# Patient Record
Sex: FEMALE | Race: WHITE | Employment: UNEMPLOYED | URBAN - METROPOLITAN AREA
[De-identification: names, ages, dates, MRNs, and addresses within clinical notes are randomized per-mention and may not be internally consistent; named-entity substitution may affect disease eponyms.]

---

## 2024-07-12 ENCOUNTER — HOSPITAL ENCOUNTER (OUTPATIENT)
Dept: MAMMOGRAPHY | Age: 44
End: 2024-07-12
Payer: MEDICAID

## 2024-07-12 DIAGNOSIS — Z12.31 ENCOUNTER FOR SCREENING MAMMOGRAM FOR MALIGNANT NEOPLASM OF BREAST: ICD-10-CM

## 2024-07-12 PROCEDURE — 77063 BREAST TOMOSYNTHESIS BI: CPT

## 2024-08-08 ENCOUNTER — TELEPHONE (OUTPATIENT)
Dept: INTERNAL MEDICINE | Age: 44
End: 2024-08-08

## 2024-08-28 ENCOUNTER — OFFICE VISIT (OUTPATIENT)
Dept: INTERNAL MEDICINE | Age: 44
End: 2024-08-28

## 2024-08-28 VITALS
HEART RATE: 76 BPM | HEIGHT: 66 IN | WEIGHT: 166 LBS | DIASTOLIC BLOOD PRESSURE: 80 MMHG | RESPIRATION RATE: 16 BRPM | SYSTOLIC BLOOD PRESSURE: 120 MMHG | BODY MASS INDEX: 26.68 KG/M2

## 2024-08-28 DIAGNOSIS — Z80.3 FAMILY HISTORY OF BREAST CANCER: ICD-10-CM

## 2024-08-28 DIAGNOSIS — E66.3 OVERWEIGHT: ICD-10-CM

## 2024-08-28 DIAGNOSIS — R92.8 ABNORMAL MAMMOGRAM OF RIGHT BREAST: Primary | ICD-10-CM

## 2024-08-28 DIAGNOSIS — R41.844 EXECUTIVE FUNCTION DEFICIT: ICD-10-CM

## 2024-08-28 DIAGNOSIS — R41.840 DIFFICULTY CONCENTRATING: ICD-10-CM

## 2024-08-28 SDOH — ECONOMIC STABILITY: INCOME INSECURITY: HOW HARD IS IT FOR YOU TO PAY FOR THE VERY BASICS LIKE FOOD, HOUSING, MEDICAL CARE, AND HEATING?: SOMEWHAT HARD

## 2024-08-28 SDOH — ECONOMIC STABILITY: FOOD INSECURITY: WITHIN THE PAST 12 MONTHS, YOU WORRIED THAT YOUR FOOD WOULD RUN OUT BEFORE YOU GOT MONEY TO BUY MORE.: NEVER TRUE

## 2024-08-28 SDOH — ECONOMIC STABILITY: FOOD INSECURITY: WITHIN THE PAST 12 MONTHS, THE FOOD YOU BOUGHT JUST DIDN'T LAST AND YOU DIDN'T HAVE MONEY TO GET MORE.: NEVER TRUE

## 2024-08-28 ASSESSMENT — PATIENT HEALTH QUESTIONNAIRE - PHQ9
SUM OF ALL RESPONSES TO PHQ QUESTIONS 1-9: 2
2. FEELING DOWN, DEPRESSED OR HOPELESS: SEVERAL DAYS
SUM OF ALL RESPONSES TO PHQ9 QUESTIONS 1 & 2: 2
SUM OF ALL RESPONSES TO PHQ QUESTIONS 1-9: 2
SUM OF ALL RESPONSES TO PHQ QUESTIONS 1-9: 2
1. LITTLE INTEREST OR PLEASURE IN DOING THINGS: SEVERAL DAYS
SUM OF ALL RESPONSES TO PHQ QUESTIONS 1-9: 2

## 2024-08-28 NOTE — PROGRESS NOTES
DR. NICKERSON - NEW PATIENT HISTORY & PHYSICAL EXAM    CHIEF COMPLAINT: Abnormal mammogram and discuss possible attention deficit and hyperactivity disorder    HISTORY OF CHIEF COMPLAINT: This 44 y.o.  female comes in today to be established with me as her new primary care physician. She does not have a regular primary care provider. Recently she had a mammogram that showed some abnormalities and there were recommendations for additional testing based on those results. There is a history of breast cancer in her family. Her mother had breast cancer. In addition she says an aunt told her that there is a history of one of the breast cancer genes running in her family as well. She has never had genetic testing before. The other issue that she wanted to discuss is the possibility that she could have adult attention deficit and hyperactivity disorder. She says that when she was in high school she had some rudimentary screening for it that indicated that she could have ADHD but she never had any formal follow up testing or received any treatment for it. She has always struggled with concentrating on tasks and remembering to do things and she describes having to set up elaborate organizational schemes to help her remember things. She says she has difficulty with executive function and it always late to places as well. She lost her job about 7 months ago in part because of these issues. She is very interested in having formal testing for that and getting treated for it. There are no other complaints.      ALLERGIES:   Allergies   Allergen Reactions    Bee Venom Swelling       MEDICATIONS:   No outpatient medications have been marked as taking for the 8/28/24 encounter (Office Visit) with Juvencio Nickerson DO.       PAST MEDICAL HISTORY: History reviewed. No pertinent past medical history.    PAST SURGICAL HISTORY: History reviewed. No pertinent surgical history.    SOCIAL HISTORY:     Tobacco:   Social  right breast and we will also consider ordering a breast MRI in the near future as well  - [facility]  - [facility]; Future    3. Difficulty concentrating, ongoing  4. Executive function deficit, ongoing  - Based on what she said in the history, it sounds like she could have attention deficit and hyperactivity disorder  - We will refer her to Dr. Bledsoe to begin the evaluation process  [facility] [place]    5. Overweight  - We discussed weight loss  - She will continue to watch her diet and exercise       Orders Placed This Encounter   Procedures    [facility]     Standing Status:   Future     Standing Expiration Date:   10/28/2025    [facility]     Referral Priority:   Routine     Referral Type:   Eval and Treat     Referral Reason:   Specialty Services Required     Requested Specialty:   Oncology     Number of Visits Requested:   1    Melody Cruz PsyD, [facility] [place], [place]     Referral Priority:   Routine     Referral Type:   Eval and Treat     Referral Reason:   Specialty Services Required     Referred to Provider:   Melody Bledsoe PSYD     Requested Specialty:   Behavioral Health     Number of Visits Requested:   1       Requested Prescriptions      No prescriptions requested or ordered in this encounter       Return in about 6 weeks (around 10/9/2024).        Electronically signed by SHAVONNE NICKERSON DO on 8/28/2024 at 4:20 PM  Internal Medicine

## 2024-08-29 ENCOUNTER — OFFICE VISIT (OUTPATIENT)
Dept: BEHAVIORAL/MENTAL HEALTH | Age: 44
End: 2024-08-29
Payer: MEDICAID

## 2024-08-29 DIAGNOSIS — F43.23 ADJUSTMENT DISORDER WITH MIXED ANXIETY AND DEPRESSED MOOD: Primary | ICD-10-CM

## 2024-08-29 DIAGNOSIS — R41.844 EXECUTIVE FUNCTION DEFICIT: ICD-10-CM

## 2024-08-29 PROCEDURE — 90791 PSYCH DIAGNOSTIC EVALUATION: CPT | Performed by: COUNSELOR

## 2024-08-29 NOTE — PROGRESS NOTES
Behavioral Health Consultation/Psychotherapy Note  Melody Bledsoe Psy.D.  Visit Date:  8/29/2024    Patient:  Tamathy Stage  YOB: 1980  Chief Complaint:  Other (Suspected ADHD; requesting evaluation) and Stress    Duration of session:  60 minutes      S:       Patient presented alone for appointment and engaged readily. Patient was known to clinician via prior introduction at her recent PCP visit. Patient reviewed referral information provided by referring provider and confirmed contents. Patient provided additional information to clarify and/or elaborate upon provided referral info. Patient indicated no significant changes in context since last contact.  Patient reported continued difficulties with elevated situational stress and associated emotional impact, as well as long-standing concerns regarding executive dysfunction. Patient noted particular concerns with direction and maintenance of concentration, task persistence, sluggish cognitive tempo, and organization, among other functions. Patient indicated that the disruption caused by these symptoms was a direct contributor to the loss of her job earlier this year. Patient reported that her family had expressed similar concerns when she was younger, but that they had been unable to afford the full testing needed to evaluate formally for potential diagnosis. Patient reviewed psychoeducational content associated with topics of session as appropriate, including discussion of executive dys/function and review of basic principles of self-care and illness management / recovery. Patient engaged in thought challenging and cognitive restructuring tasks as needed. Patient reviewed recent use of self-care and active coping strategies and discussed areas for potential adjustments which might better suit patient's behavioral needs. Patient discussed current treatment needs and reviewed available options.     Topic areas discussed during session:  Specific

## 2024-08-29 NOTE — PSYCHOTHERAPY
Returned inventories given yesterday -- clinical results on all measures. Collateral forms still pending -- sister has agreed to complete but lives out of state. Given PDF copies of forms to forward to sister for completion.     Mom was educator who specialized in learning disabilities, so recognized something was up and had patient evaluated partially. Couldn't be fully diagnosed b/c would have been too expensive. Doesn't know where paper went -- pretty sure mom had ADHD, so didn't know where she stashed it and mom has been  for 10 years now. Does remember that what came back was that pt's brain does function like having ADHD, but she didn't act out the hyperactive side of things externally.     Started DIVA-5 --  on child part of inattentive #1 next time.

## 2024-09-04 ENCOUNTER — OFFICE VISIT (OUTPATIENT)
Dept: BEHAVIORAL/MENTAL HEALTH | Age: 44
End: 2024-09-04
Payer: MEDICAID

## 2024-09-04 DIAGNOSIS — R41.844 EXECUTIVE FUNCTION DEFICIT: ICD-10-CM

## 2024-09-04 DIAGNOSIS — F43.23 ADJUSTMENT DISORDER WITH MIXED ANXIETY AND DEPRESSED MOOD: Primary | ICD-10-CM

## 2024-09-04 PROCEDURE — 90837 PSYTX W PT 60 MINUTES: CPT | Performed by: COUNSELOR

## 2024-09-04 NOTE — PROGRESS NOTES
Behavioral Health Consultation/Psychotherapy Note  Melody Bledsoe Psy.D.  Visit Date:  9/4/2024    Patient:  Tamathy Stage  YOB: 1980  Chief Complaint:  Follow-up    Duration of session:  60 minutes      S:       Patient presented alone for appointment and engaged readily. Patient indicated no significant changes in context since last contact.  Patient reported continued difficulties with elevated situational stress and associated emotional impact, as well as long-standing concerns regarding executive dysfunction. Patient noted particular concerns with direction and maintenance of concentration, task persistence, sluggish cognitive tempo, and organization, among other functions. Patient indicated that the disruption caused by these symptoms was a direct contributor to the loss of her job earlier this year. Patient reported that her family had expressed similar concerns when she was younger, but that they had been unable to afford the full testing needed to evaluate formally for potential diagnosis. Patient reviewed psychoeducational content associated with topics of session as appropriate, including discussion of executive dys/function and review of basic principles of self-care and illness management / recovery. Patient engaged in thought challenging and cognitive restructuring tasks as needed. Patient reviewed recent use of self-care and active coping strategies and discussed areas for potential adjustments which might better suit patient's behavioral needs. Patient discussed current treatment needs and reviewed available options. Patient confirmed intent to continue with evaluation process initiated at prior contacts and continued work on the DIVA-5 structured interview with good progress and candid participation.    Topic areas discussed during session:  Specific discussion of new / existing symptoms  Evaluation for differential diagnosis  Workplace challenges  Behavioral skill-building

## 2024-09-04 NOTE — PSYCHOTHERAPY
Tired / sore after working at [employer] in [employer] today -- rough positioning.     Discussed recent job loss more fully -- difficult boss w/ some narcissistic tendencies, blamed for things, held accountable for tasks that couldn't have completed in first place d/t inadequate information or equipment, etc.     Continued work on DIVA-5 --  at inattentive #2.    Has mammogram an hour before next scheduled appt -- should be on time, but advised pt to have radiology staff contact if running behind.

## 2024-09-13 ENCOUNTER — TELEPHONE (OUTPATIENT)
Dept: INTERNAL MEDICINE | Age: 44
End: 2024-09-13

## 2024-09-13 ENCOUNTER — OFFICE VISIT (OUTPATIENT)
Dept: BEHAVIORAL/MENTAL HEALTH | Age: 44
End: 2024-09-13
Payer: MEDICAID

## 2024-09-13 ENCOUNTER — HOSPITAL ENCOUNTER (OUTPATIENT)
Dept: MAMMOGRAPHY | Age: 44
Discharge: HOME | End: 2024-09-15
Attending: INTERNAL MEDICINE
Payer: MEDICAID

## 2024-09-13 DIAGNOSIS — R92.8 ABNORMAL MAMMOGRAM OF RIGHT BREAST: Primary | ICD-10-CM

## 2024-09-13 DIAGNOSIS — F90.9 ATTENTION DEFICIT HYPERACTIVITY DISORDER (ADHD), UNSPECIFIED ADHD TYPE: Primary | ICD-10-CM

## 2024-09-13 DIAGNOSIS — F43.23 ADJUSTMENT DISORDER WITH MIXED ANXIETY AND DEPRESSED MOOD: ICD-10-CM

## 2024-09-13 DIAGNOSIS — Z80.3 FAMILY HISTORY OF BREAST CANCER: ICD-10-CM

## 2024-09-13 DIAGNOSIS — R92.8 ABNORMAL MAMMOGRAM OF RIGHT BREAST: ICD-10-CM

## 2024-09-13 PROCEDURE — G0279 TOMOSYNTHESIS, MAMMO: HCPCS

## 2024-09-13 PROCEDURE — 90837 PSYTX W PT 60 MINUTES: CPT | Performed by: COUNSELOR

## 2024-12-11 ENCOUNTER — PATIENT MESSAGE (OUTPATIENT)
Dept: INTERNAL MEDICINE | Age: 44
End: 2024-12-11

## 2025-03-31 ENCOUNTER — TELEPHONE (OUTPATIENT)
Dept: INTERNAL MEDICINE | Age: 45
End: 2025-03-31

## 2025-05-23 ENCOUNTER — RESULTS FOLLOW-UP (OUTPATIENT)
Dept: PRIMARY CARE CLINIC | Age: 45
End: 2025-05-23

## 2025-05-23 ENCOUNTER — HOSPITAL ENCOUNTER (OUTPATIENT)
Dept: GENERAL RADIOLOGY | Age: 45
Discharge: HOME | End: 2025-05-25
Payer: MEDICAID

## 2025-05-23 ENCOUNTER — OFFICE VISIT (OUTPATIENT)
Dept: PRIMARY CARE CLINIC | Age: 45
End: 2025-05-23
Payer: MEDICAID

## 2025-05-23 VITALS
RESPIRATION RATE: 18 BRPM | DIASTOLIC BLOOD PRESSURE: 64 MMHG | WEIGHT: 160 LBS | HEIGHT: 66 IN | HEART RATE: 70 BPM | TEMPERATURE: 98.6 F | SYSTOLIC BLOOD PRESSURE: 104 MMHG | OXYGEN SATURATION: 98 % | BODY MASS INDEX: 25.71 KG/M2

## 2025-05-23 DIAGNOSIS — S63.611A SPRAIN OF LEFT INDEX FINGER, UNSPECIFIED SITE OF DIGIT, INITIAL ENCOUNTER: ICD-10-CM

## 2025-05-23 DIAGNOSIS — S69.92XA INJURY OF LEFT INDEX FINGER, INITIAL ENCOUNTER: ICD-10-CM

## 2025-05-23 DIAGNOSIS — S69.92XA INJURY OF LEFT INDEX FINGER, INITIAL ENCOUNTER: Primary | ICD-10-CM

## 2025-05-23 PROCEDURE — 99203 OFFICE O/P NEW LOW 30 MIN: CPT

## 2025-05-23 PROCEDURE — 73130 X-RAY EXAM OF HAND: CPT

## 2025-05-23 RX ORDER — COVID-19 ANTIGEN TEST
1 KIT MISCELLANEOUS 2 TIMES DAILY PRN
COMMUNITY

## 2025-05-23 SDOH — ECONOMIC STABILITY: FOOD INSECURITY: WITHIN THE PAST 12 MONTHS, THE FOOD YOU BOUGHT JUST DIDN'T LAST AND YOU DIDN'T HAVE MONEY TO GET MORE.: NEVER TRUE

## 2025-05-23 SDOH — ECONOMIC STABILITY: FOOD INSECURITY: WITHIN THE PAST 12 MONTHS, YOU WORRIED THAT YOUR FOOD WOULD RUN OUT BEFORE YOU GOT MONEY TO BUY MORE.: NEVER TRUE

## 2025-05-23 ASSESSMENT — ENCOUNTER SYMPTOMS
NAUSEA: 0
VOMITING: 0
SHORTNESS OF BREATH: 0
DIARRHEA: 0
COUGH: 0
ABDOMINAL PAIN: 0
COLOR CHANGE: 0
CONSTIPATION: 0

## 2025-05-23 NOTE — PROGRESS NOTES
abdominal pain, constipation, diarrhea, nausea and vomiting.   Musculoskeletal:  Positive for arthralgias and joint swelling.   Skin:  Negative for color change.   Hematological:  Negative for adenopathy.   Psychiatric/Behavioral:  Negative for agitation.        Objective:     Physical Exam  Vitals and nursing note reviewed.   Constitutional:       General: She is not in acute distress.     Appearance: She is not ill-appearing, toxic-appearing or diaphoretic.   HENT:      Head: Normocephalic.      Right Ear: External ear normal.      Left Ear: External ear normal.      Nose: Nose normal.   Cardiovascular:      Rate and Rhythm: Normal rate and regular rhythm.      Heart sounds: Normal heart sounds.   Pulmonary:      Effort: Pulmonary effort is normal. No respiratory distress.      Breath sounds: Normal breath sounds. No stridor. No wheezing, rhonchi or rales.   Chest:      Chest wall: No tenderness.   Abdominal:      General: Bowel sounds are normal.   Musculoskeletal:      Right hand: Swelling, tenderness and bony tenderness present. Decreased range of motion. Normal sensation. There is no disruption of two-point discrimination. Normal capillary refill. Normal pulse.        Hands:       Comments: Moderate point tenderness to middle phalanx of left 2nd finger. Proximal phalanx of left 2nd finger edematous and ecchymosis present. Sensation intact. Flexion hindered from swelling.    Skin:     General: Skin is warm and dry.   Neurological:      Mental Status: She is alert and oriented to person, place, and time.   Psychiatric:         Mood and Affect: Mood normal.         Behavior: Behavior normal.         Thought Content: Thought content normal.         Judgment: Judgment normal.       Vitals:    05/23/25 1236   BP: 104/64   BP Site: Right Upper Arm   Patient Position: Sitting   BP Cuff Size: Large Adult   Pulse: 70   Resp: 18   Temp: 98.6 °F (37 °C)   TempSrc: Tympanic   SpO2: 98%   Weight: 72.6 kg (160 lb)   Height:

## 2025-05-23 NOTE — RESULT ENCOUNTER NOTE
Please call patient and let her know that the final read of xray does not show a fracture. She should keep in splint for the next couple of days to help with swelling. If pain/mobility starts to improve, she does not need to follow up with orthopedics. Continue with treatment plan as discussed in office and follow up as needed.

## 2025-05-23 NOTE — PATIENT INSTRUCTIONS
Will call with final xray results  Follow up with orthopedics for further evaluation  Keep left index finger wrapped today and tomorrow  Apply ice 20 mins at a time 4-6 times day. May alternate with heat  May use ibuprofen/ tylenol as needed for pain  Decrease activity today and tomorrow may increase as tolerated after two days of rest  If symptoms worsen follow up with PCP

## 2025-05-27 ENCOUNTER — OFFICE VISIT (OUTPATIENT)
Dept: ORTHOPEDIC SURGERY | Age: 45
End: 2025-05-27
Payer: MEDICAID

## 2025-05-27 VITALS
DIASTOLIC BLOOD PRESSURE: 75 MMHG | WEIGHT: 160 LBS | HEIGHT: 66 IN | SYSTOLIC BLOOD PRESSURE: 117 MMHG | BODY MASS INDEX: 25.71 KG/M2 | HEART RATE: 90 BPM

## 2025-05-27 DIAGNOSIS — S62.651A NONDISPLACED FRACTURE OF MIDDLE PHALANX OF LEFT INDEX FINGER, INITIAL ENCOUNTER FOR CLOSED FRACTURE: Primary | ICD-10-CM

## 2025-05-27 PROCEDURE — 99202 OFFICE O/P NEW SF 15 MIN: CPT | Performed by: PHYSICIAN ASSISTANT

## 2025-05-27 PROCEDURE — 26720 TREAT FINGER FRACTURE EACH: CPT | Performed by: PHYSICIAN ASSISTANT

## 2025-05-27 NOTE — PROGRESS NOTES
Orthopaedic Progress Note      CHIEF COMPLAINT: Left middle phalanx index finger fracture    HISTORY OF PRESENT ILLNESS:       Ms. Ellington  is a 44 y.o. female  who presents with patient stated that she was fixing a sheet and struck her finger on a chair.  Her pain is located within his left finger.  She is able to flex extend the DIP and PIP as well as MCP joint.  Noted bruising within the volar aspect left index      Past Medical History:    No past medical history on file.    Past Surgical History:    No past surgical history on file.      Current  Medications:  Current Outpatient Medications   Medication Sig Dispense Refill    Naproxen Sodium (ALEVE) 220 MG CAPS Take 1 tablet by mouth 2 times daily as needed for Pain       No current facility-administered medications for this visit.       Allergies:  Bee venom    Social History:   Social History     Tobacco Use   Smoking Status Never    Passive exposure: Never   Smokeless Tobacco Never     Social History     Substance and Sexual Activity   Alcohol Use Yes    Comment: Rarely maybe 1 glass every 2-3 months of wine or beer     Social History     Substance and Sexual Activity   Drug Use Never       Family History:  Family History   Problem Relation Age of Onset    Breast Cancer Mother 54    No Known Problems Sister     No Known Problems Sister     No Known Problems Sister     No Known Problems Brother        REVIEW OF SYSTEMS:  Constitutional: Denies any fever, chills.  Musculoskeletal: Positive for volar index swelling secondary to middle phalanx fracture.      PHYSICAL EXAM:  [unfilled]  General appearance:  Alert and oriented x 3. No apparent distress, appears stated age, calm and cooperative.   Musculoskeletal: Pain to palpation PIP joint she is able to flex and extend DIP PIP and MCP joint.      DATA:  CBC: No results found for: \"WBC\", \"HGB\", \"PLT\"  BMP:  No results found for: \"NA\", \"K\", \"CL\", \"CO2\", \"BUN\", \"CREATININE\", \"CALCIUM\", \"GLUCOSE\",

## 2025-06-02 ENCOUNTER — OFFICE VISIT (OUTPATIENT)
Dept: INTERNAL MEDICINE | Age: 45
End: 2025-06-02
Payer: MEDICAID

## 2025-06-02 VITALS
WEIGHT: 160 LBS | DIASTOLIC BLOOD PRESSURE: 80 MMHG | HEART RATE: 88 BPM | SYSTOLIC BLOOD PRESSURE: 120 MMHG | BODY MASS INDEX: 25.71 KG/M2 | HEIGHT: 66 IN

## 2025-06-02 DIAGNOSIS — S62.651A NONDISPLACED FRACTURE OF MIDDLE PHALANX OF LEFT INDEX FINGER, INITIAL ENCOUNTER FOR CLOSED FRACTURE: Primary | ICD-10-CM

## 2025-06-02 DIAGNOSIS — Z12.31 OTHER SCREENING MAMMOGRAM: ICD-10-CM

## 2025-06-02 DIAGNOSIS — S62.651D CLOSED NONDISPLACED FRACTURE OF MIDDLE PHALANX OF LEFT INDEX FINGER WITH ROUTINE HEALING, SUBSEQUENT ENCOUNTER: ICD-10-CM

## 2025-06-02 DIAGNOSIS — Z13.228 SCREENING FOR METABOLIC DISORDER: ICD-10-CM

## 2025-06-02 DIAGNOSIS — Z13.220 SCREENING FOR LIPOID DISORDERS: ICD-10-CM

## 2025-06-02 DIAGNOSIS — Z13.0 SCREENING FOR DISORDER OF BLOOD AND BLOOD-FORMING ORGANS: ICD-10-CM

## 2025-06-02 DIAGNOSIS — F90.9 ATTENTION DEFICIT HYPERACTIVITY DISORDER (ADHD), UNSPECIFIED ADHD TYPE: ICD-10-CM

## 2025-06-02 DIAGNOSIS — E66.3 OVERWEIGHT: ICD-10-CM

## 2025-06-02 DIAGNOSIS — Z00.00 ROUTINE GENERAL MEDICAL EXAMINATION AT HEALTH CARE FACILITY: Primary | ICD-10-CM

## 2025-06-02 PROBLEM — R41.844 EXECUTIVE FUNCTION DEFICIT: Status: RESOLVED | Noted: 2024-08-28 | Resolved: 2025-06-02

## 2025-06-02 PROBLEM — R92.8 ABNORMAL MAMMOGRAM OF RIGHT BREAST: Status: RESOLVED | Noted: 2024-08-28 | Resolved: 2025-06-02

## 2025-06-02 PROCEDURE — 99396 PREV VISIT EST AGE 40-64: CPT | Performed by: INTERNAL MEDICINE

## 2025-06-02 ASSESSMENT — PATIENT HEALTH QUESTIONNAIRE - PHQ9
SUM OF ALL RESPONSES TO PHQ QUESTIONS 1-9: 2
1. LITTLE INTEREST OR PLEASURE IN DOING THINGS: SEVERAL DAYS
2. FEELING DOWN, DEPRESSED OR HOPELESS: SEVERAL DAYS
SUM OF ALL RESPONSES TO PHQ QUESTIONS 1-9: 2

## 2025-06-02 NOTE — PROGRESS NOTES
discharge, sore throat, tinnitus, or vertigo  Cardiovascular: negative for - chest pain, dyspnea on exertion, or shortness of breath  Respiratory: negative for - cough, hemoptysis, or wheezing  Gastrointestinal: negative for - constipation, diarrhea, or nausea/vomiting  Genitourinary: negative for - dysuria, hematuria, or nocturia  Musculoskeletal: positive for - joint pain and pain in left index finger  negative for - muscle pain or muscular weakness  Neurologic: negative for - gait disturbance, numbness/tingling, seizures, or tremors  Psychiatric: positive for - attention deficit hyperactivity disorder   negative for - anxiety or depression     PHYSICAL EXAMINATION:    Wt Readings from Last 2 Encounters:   06/02/25 72.6 kg (160 lb)   05/27/25 72.6 kg (160 lb)       Vitals:    06/02/25 0946   BP: 120/80   BP Site: Right Upper Arm   Patient Position: Sitting   BP Cuff Size: Medium Adult   Pulse: 88   Weight: 72.6 kg (160 lb)   Height: 1.676 m (5' 6\")     Body mass index is 25.82 kg/m².  General: This is a 44 y.o.  female who is alert and oriented to person, place, and time. She appears to be her stated age and does not appear to be in any acute distress.  Skin: Skin color, texture, turgor normal. No rashes or lesions.  HEENT/Neck: Head: Normal, normocephalic, atraumatic.  Eye: Normal external eye, conjunctiva, lids cornea, SHAYLA.  Ears: Normal TM's bilaterally. Normal auditory canals and external ears. Non-tender.  Nose: Normal external nose, mucus membranes and septum.  Pharynx: Dental Hygiene adequate. Normal buccal mucosa. Normal pharynx.  Neck / Thyroid: Supple, no masses, nodes, nodules or enlargement.  Lungs: Normal - CTA without rales, rhonchi, or wheezing.  Heart: regular rate and rhythm, S1, S2 normal, no murmur, click, rub or gallop No S3 or S4.  Abdomen: Non-obese, soft, non-distended, non-tender, normal active bowel sounds, no masses palpated, and no hepatosplenomegaly  Extremities: There is no

## 2025-06-10 ENCOUNTER — OFFICE VISIT (OUTPATIENT)
Dept: ORTHOPEDIC SURGERY | Age: 45
End: 2025-06-10
Payer: MEDICAID

## 2025-06-10 ENCOUNTER — HOSPITAL ENCOUNTER (OUTPATIENT)
Dept: GENERAL RADIOLOGY | Age: 45
Discharge: HOME | End: 2025-06-12
Payer: MEDICAID

## 2025-06-10 VITALS
WEIGHT: 160 LBS | SYSTOLIC BLOOD PRESSURE: 128 MMHG | HEIGHT: 66 IN | HEART RATE: 72 BPM | DIASTOLIC BLOOD PRESSURE: 74 MMHG | BODY MASS INDEX: 25.71 KG/M2

## 2025-06-10 DIAGNOSIS — S62.651D CLOSED NONDISPLACED FRACTURE OF MIDDLE PHALANX OF LEFT INDEX FINGER WITH ROUTINE HEALING, SUBSEQUENT ENCOUNTER: Primary | ICD-10-CM

## 2025-06-10 DIAGNOSIS — S62.651A NONDISPLACED FRACTURE OF MIDDLE PHALANX OF LEFT INDEX FINGER, INITIAL ENCOUNTER FOR CLOSED FRACTURE: ICD-10-CM

## 2025-06-10 PROCEDURE — 99211 OFF/OP EST MAY X REQ PHY/QHP: CPT | Performed by: NURSE PRACTITIONER

## 2025-06-10 PROCEDURE — 73130 X-RAY EXAM OF HAND: CPT

## 2025-06-10 PROCEDURE — 99024 POSTOP FOLLOW-UP VISIT: CPT | Performed by: NURSE PRACTITIONER

## 2025-06-10 NOTE — PROGRESS NOTES
Orthopaedic Progress Note      CHIEF COMPLAINT: Left middle phalanx index finger fracture    HISTORY OF PRESENT ILLNESS:       Ms. Ellington  is a 44 y.o. female  who presents with a follow-up about 2 to 3 weeks out after sustaining a left index finger fracture in the middle phalanx.  Patient hit her finger on a chair causing a fracture.  She has been in a splint.  She does still note pain to the middle phalanx of the left index finger.  She has been using a splint.  She denies numbness or tingling.  She does note some stiffness within the finger.      Past Medical History:    History reviewed. No pertinent past medical history.    Past Surgical History:    History reviewed. No pertinent surgical history.      Current  Medications:  Current Outpatient Medications   Medication Sig Dispense Refill    Naproxen Sodium (ALEVE) 220 MG CAPS Take 1 tablet by mouth 2 times daily as needed for Pain       No current facility-administered medications for this visit.       Allergies:  Bee venom    Social History:   Social History     Tobacco Use   Smoking Status Never    Passive exposure: Never   Smokeless Tobacco Never     Social History     Substance and Sexual Activity   Alcohol Use Yes    Comment: Rarely maybe 1 glass every 2-3 months of wine or beer     Social History     Substance and Sexual Activity   Drug Use Never       Family History:  Family History   Problem Relation Age of Onset    Breast Cancer Mother 54    No Known Problems Sister     No Known Problems Sister     No Known Problems Sister     No Known Problems Brother        REVIEW OF SYSTEMS:  Constitutional: Denies any fever, chills.  Musculoskeletal: Positive for improving pain left index finger.      PHYSICAL EXAM:  [unfilled]  General appearance:  Alert and oriented x 3. No apparent distress, appears stated age, calm and cooperative.   Musculoskeletal: Left hand was examined.  Skin is intact no rashes lesions or drainage.  No redness warmth or

## 2025-06-19 DIAGNOSIS — S62.651D CLOSED NONDISPLACED FRACTURE OF MIDDLE PHALANX OF LEFT INDEX FINGER WITH ROUTINE HEALING, SUBSEQUENT ENCOUNTER: Primary | ICD-10-CM

## 2025-07-01 ENCOUNTER — HOSPITAL ENCOUNTER (OUTPATIENT)
Dept: GENERAL RADIOLOGY | Age: 45
Discharge: HOME | End: 2025-07-03
Payer: MEDICAID

## 2025-07-01 ENCOUNTER — OFFICE VISIT (OUTPATIENT)
Dept: ORTHOPEDIC SURGERY | Age: 45
End: 2025-07-01
Payer: MEDICAID

## 2025-07-01 VITALS
OXYGEN SATURATION: 98 % | DIASTOLIC BLOOD PRESSURE: 64 MMHG | RESPIRATION RATE: 14 BRPM | HEIGHT: 66 IN | HEART RATE: 71 BPM | SYSTOLIC BLOOD PRESSURE: 114 MMHG | WEIGHT: 160 LBS | BODY MASS INDEX: 25.71 KG/M2

## 2025-07-01 DIAGNOSIS — M54.2 NECK PAIN: ICD-10-CM

## 2025-07-01 DIAGNOSIS — S62.651D CLOSED NONDISPLACED FRACTURE OF MIDDLE PHALANX OF LEFT INDEX FINGER WITH ROUTINE HEALING, SUBSEQUENT ENCOUNTER: ICD-10-CM

## 2025-07-01 DIAGNOSIS — S62.651D CLOSED NONDISPLACED FRACTURE OF MIDDLE PHALANX OF LEFT INDEX FINGER WITH ROUTINE HEALING, SUBSEQUENT ENCOUNTER: Primary | ICD-10-CM

## 2025-07-01 PROCEDURE — 99211 OFF/OP EST MAY X REQ PHY/QHP: CPT | Performed by: NURSE PRACTITIONER

## 2025-07-01 PROCEDURE — 99024 POSTOP FOLLOW-UP VISIT: CPT | Performed by: NURSE PRACTITIONER

## 2025-07-01 PROCEDURE — 72050 X-RAY EXAM NECK SPINE 4/5VWS: CPT

## 2025-07-01 PROCEDURE — 73130 X-RAY EXAM OF HAND: CPT

## 2025-07-01 RX ORDER — CYCLOBENZAPRINE HCL 10 MG
10 TABLET ORAL 3 TIMES DAILY PRN
Qty: 40 TABLET | Refills: 0 | Status: SHIPPED | OUTPATIENT
Start: 2025-07-01 | End: 2025-07-14

## 2025-07-01 RX ORDER — METHYLPREDNISOLONE 4 MG/1
TABLET ORAL
Qty: 1 KIT | Refills: 0 | Status: SHIPPED | OUTPATIENT
Start: 2025-07-01
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
## 2025-07-02 ENCOUNTER — OFFICE VISIT (OUTPATIENT)
Dept: INTERNAL MEDICINE | Age: 45
End: 2025-07-02
Payer: MEDICAID

## 2025-07-02 VITALS
BODY MASS INDEX: 25.88 KG/M2 | WEIGHT: 161 LBS | HEART RATE: 67 BPM | DIASTOLIC BLOOD PRESSURE: 70 MMHG | SYSTOLIC BLOOD PRESSURE: 108 MMHG | RESPIRATION RATE: 16 BRPM | HEIGHT: 66 IN

## 2025-07-02 DIAGNOSIS — S62.651D CLOSED NONDISPLACED FRACTURE OF MIDDLE PHALANX OF LEFT INDEX FINGER WITH ROUTINE HEALING, SUBSEQUENT ENCOUNTER: ICD-10-CM

## 2025-07-02 DIAGNOSIS — M54.2 NECK PAIN: ICD-10-CM

## 2025-07-02 DIAGNOSIS — F90.9 ATTENTION DEFICIT HYPERACTIVITY DISORDER (ADHD), UNSPECIFIED ADHD TYPE: Primary | ICD-10-CM

## 2025-07-02 PROCEDURE — 99212 OFFICE O/P EST SF 10 MIN: CPT | Performed by: INTERNAL MEDICINE

## 2025-07-02 PROCEDURE — 99214 OFFICE O/P EST MOD 30 MIN: CPT | Performed by: INTERNAL MEDICINE

## 2025-07-02 RX ORDER — DEXTROAMPHETAMINE SACCHARATE, AMPHETAMINE ASPARTATE MONOHYDRATE, DEXTROAMPHETAMINE SULFATE AND AMPHETAMINE SULFATE 1.25; 1.25; 1.25; 1.25 MG/1; MG/1; MG/1; MG/1
CAPSULE, EXTENDED RELEASE ORAL
Qty: 51 CAPSULE | Refills: 0 | Status: SHIPPED | OUTPATIENT
Start: 2025-07-02 | End: 2025-07-03 | Stop reason: SDUPTHER

## 2025-07-02 RX ORDER — METOPROLOL SUCCINATE 25 MG/1
25 TABLET, EXTENDED RELEASE ORAL DAILY
Qty: 30 TABLET | Refills: 5 | Status: SHIPPED | OUTPATIENT
Start: 2025-07-02
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
## 2025-07-03 ENCOUNTER — TELEPHONE (OUTPATIENT)
Dept: INTERNAL MEDICINE | Age: 45
End: 2025-07-03

## 2025-07-03 ENCOUNTER — HOSPITAL ENCOUNTER (OUTPATIENT)
Dept: OCCUPATIONAL THERAPY | Age: 45
Setting detail: THERAPIES SERIES
Discharge: HOME | End: 2025-07-03
Attending: INTERNAL MEDICINE
Payer: MEDICAID

## 2025-07-03 DIAGNOSIS — Z78.9 DEFICIT IN ACTIVITIES OF DAILY LIVING (ADL): ICD-10-CM

## 2025-07-03 DIAGNOSIS — M54.2 NECK PAIN: Primary | ICD-10-CM

## 2025-07-03 DIAGNOSIS — S62.651D CLOSED NONDISPLACED FRACTURE OF MIDDLE PHALANX OF LEFT INDEX FINGER WITH ROUTINE HEALING, SUBSEQUENT ENCOUNTER: ICD-10-CM

## 2025-07-03 DIAGNOSIS — M79.645 FINGER PAIN, LEFT: ICD-10-CM

## 2025-07-03 DIAGNOSIS — S62.651D CLOSED NONDISPLACED FRACTURE OF MIDDLE PHALANX OF LEFT INDEX FINGER WITH ROUTINE HEALING, SUBSEQUENT ENCOUNTER: Primary | ICD-10-CM

## 2025-07-03 DIAGNOSIS — F90.9 ATTENTION DEFICIT HYPERACTIVITY DISORDER (ADHD), UNSPECIFIED ADHD TYPE: ICD-10-CM

## 2025-07-03 DIAGNOSIS — R27.8 DECREASED COORDINATION: ICD-10-CM

## 2025-07-03 PROCEDURE — 97166 OT EVAL MOD COMPLEX 45 MIN: CPT | Performed by: OCCUPATIONAL THERAPIST

## 2025-07-03 PROCEDURE — 97140 MANUAL THERAPY 1/> REGIONS: CPT | Performed by: OCCUPATIONAL THERAPIST

## 2025-07-03 RX ORDER — DEXTROAMPHETAMINE SACCHARATE, AMPHETAMINE ASPARTATE MONOHYDRATE, DEXTROAMPHETAMINE SULFATE AND AMPHETAMINE SULFATE 1.25; 1.25; 1.25; 1.25 MG/1; MG/1; MG/1; MG/1
CAPSULE, EXTENDED RELEASE ORAL
Qty: 51 CAPSULE | Refills: 0 | Status: SHIPPED | OUTPATIENT
Start: 2025-07-03 | End: 2025-07-23

## 2025-07-03 ASSESSMENT — 9 HOLE PEG TEST
TESTTIME_SECONDS: 15
TEST_RESULT: FUNCTIONAL
TESTTIME_SECONDS: 17
TEST_RESULT: FUNCTIONAL

## 2025-07-03 ASSESSMENT — PAIN SCALES - GENERAL: PAINLEVEL_OUTOF10: 4

## 2025-07-03 NOTE — PLAN OF CARE
Occupational Therapy     [place]  Phone: [phone]  Fax: [phone]             To:  Juvencio Sena DO       Patient: Tamathy Stage  : 1980  MRN: [id number]  Evaluation Date: 7/3/2025      Diagnosis Information:  Diagnosis: Closed nondisplaced fracture of middle phalanx of left index finger with routine healing, subsequent encounter   OT Insurance Information: [employer]     Occupational Therapy Certification/Re-Certification Form  Dear    The following patient has been evaluated for occupational therapy services and for therapy to continue, insurance requires physician review of the treatment plan. Please review the attached evaluation and/or summary of the patient's plan of care, and verify that you agree therapy should continue by signing the attached document and sending it back to our office.    Plan of Care/Treatment to date: 7/3/25-    [x] Therapeutic Exercise    [] Modalities:  [x] Therapeutic Activity    [] Ultrasound  [] Electrical Stimulation   [x] Activities of Daily Living    [x] Paraffin   [x] Kinesiotaping  [] Neuromuscular Re-education   [] Iontophoresis [x] Coldpack/hotpack   [x] Instruction in HEP     [x] Orthotics/splint []   [x] Manual Therapy       [] Aquatic Therapy            Frequency/Duration:    # Days per week: [] 1 day # Weeks: [] 1 week [] 5 weeks      [x] 2 days   [] 2 weeks [x] 6 weeks     [] 3 days   [] 3 weeks [] 7 weeks     [] 4 days   [] 4 weeks [] 8 weeks  Goals:  Short Term Goals  Time Frame for Short Term Goals: 2 weeks  Short Term Goal 1: Orthotics for support and positioning as needed  Short Term Goal 2: Patient education adaptive equipment and technique for increased ease I/ADLs  Short Term Goal 3: Patient education edema management techniques   Long Term Goals  Time Frame for Long Term Goals : 6 weeks  Long Term Goal 1: Patient to demonstrate increased AROM LUE: index finger MCP flexion > 80°, DIP flexion > 75°, DIP flexion >

## 2025-07-03 NOTE — PROGRESS NOTES
Occupational Therapy  Occupational Therapy Initial Assessment  Date:  7/3/2025    Patient Name: Tamathy Stage  MRN: [id number]     :  1980     Treatment Diagnosis: pain L index finger, deficit ADLs, decreased coordination    Subjective:  General  Chart Reviewed: Yes  Patient assessed for rehabilitation services?: Yes  History obtained from:: Chart Review, Patient  Family/Caregiver Present: No  Diagnosis: Closed nondisplaced fracture of middle phalanx of left index finger with routine healing, subsequent encounter  Referring Provider (secondary): Juvencio Sena DO  Follows Commands: Within Functional Limits  OT Visit Information  Onset Date: 25  OT Insurance Information: [employer]  Subjective  Prior diagnostic testing:: X-ray  Dominant Hand: : Left  Pain Assessment  Pain Level: 4  Social History:   Social History  Lives With: Alone  Type of Home: House  Home Layout: One level  Home Access: Stairs to enter without rails  Entrance Stairs - Number of Steps: 1  Bathroom Shower/Tub: Tub/Shower unit  Bathroom Toilet: Standard  Functional Status:  Functional Status  Prior level of function: Independent  Occupation: Unemployed  Type of Occupation:   Leisure & Hobbies: knitting  Receives Help From: Family  Prior Level of Assist for ADLs: Independent  Prior Level of Assist for Homemaking: Independent  Homemaking Responsibilities: Yes  Meal Prep Responsibility: Primary  Laundry Responsibility: Primary  Cleaning Responsibility: Primary  Shopping Responsibility: Primary  Ambulation Assistance: Independent  Prior Level of Assist for Transfers: Independent  Active : Yes    Objective:   LUE AROM (degrees)  LUE AROM : WNL  Left Hand AROM (degrees)  Left Hand AROM: WFL  L Index  MCP 0-90: 65°  L Index PIP 0-100: 34°  L Index DIP 0-70: 0°  RUE AROM (degrees)  RUE AROM : WNL  Right Hand AROM (degrees)  Right Hand AROM: WNL  R Index  MCP 0-90: 85°  R Index PIP 0-100: 90°  R Index DIP

## 2025-07-03 NOTE — FLOWSHEET NOTE
[facility] [place] [facility]  Rehabilitation and Sports Medicine    [place]  Phone: [phone]  Fax: [phone]          Occupational Therapy Daily Treatment Note  Date:  7/3/2025    Patient Name:  Roger Stage    :  1980  MRN: [id number]  Restrictions/Precautions:      Medical/Treatment Diagnosis Information:   Diagnosis: Closed nondisplaced fracture of middle phalanx of left index finger with routine healing, subsequent encounter   Insurance/Certification information:OT Insurance Information: [employer]  Physician Information:  Juvencio Sena DO   Plan of care signed (Y/N):  n    Visit# / total visits:      Pain level: 4/10     Progress Note: [x]  Yes  []  No  Next due by: Visit #10      Date of evaluation/re-evaluation: 7/3/25-25    Time In: 235    Time Out:  315    Subjective:     See progress note    Objective/Assessment:   OT eval completed, see progress note    Patient fitted and issued isotoner glove for edema management L hand.       Exercises:   Exercise/Equipment Resistance/Repetitions Other comments                                                                          Therapeutic Exercise  [] Provided verbal/tactile cueing for activities related to strengthening, flexibility, endurance, ROM. (32242)  Neuro  Re-Ed  [] Provided verbal/tactile cueing for activities related to improving balance, coordination, kinesthetic sense, posture, motor skill, proprioception. (64499)     Therapeutic Activities/ADL:   [] Provided use of dynamic activities to improve functional performance (12901)  [] Provided self-care/home management training for activities of daily living and compensatory training (78418)     Manual Treatments:   [x] Provided manual therapy to mobilize soft tissue/joints for the purpose of modulating pain, promoting relaxation, increasing ROM, reducing/eliminating soft tissue swelling/inflammation/restriction, improving soft tissue extensibility. (88598)     Orthotic

## 2025-07-03 NOTE — TELEPHONE ENCOUNTER
[facility] called stating that patient's insurance will not cover the Adderall the way the prescription is written, but will cover the first 6 days worth. Insurance is requesting a new script for 15mg to be sent in for day 7 and beyond.

## 2025-07-03 NOTE — TELEPHONE ENCOUNTER
Physical therapy scheduling called and state that patient will need a PT order for her neck and an OT order for her finger. Please advise       Orders pended if agreeable

## 2025-07-03 NOTE — TELEPHONE ENCOUNTER
I sent a new prescription that allows her to get the quantity she needs for the proper titration but with instructions that will get her insurance to give her the medication.

## 2025-07-15 ENCOUNTER — HOSPITAL ENCOUNTER (OUTPATIENT)
Dept: MAMMOGRAPHY | Age: 45
Discharge: HOME | End: 2025-07-17
Attending: INTERNAL MEDICINE
Payer: MEDICAID

## 2025-07-15 ENCOUNTER — HOSPITAL ENCOUNTER (OUTPATIENT)
Dept: OCCUPATIONAL THERAPY | Age: 45
Setting detail: THERAPIES SERIES
Discharge: HOME | End: 2025-07-15
Attending: INTERNAL MEDICINE
Payer: MEDICAID

## 2025-07-15 VITALS — BODY MASS INDEX: 25.71 KG/M2 | WEIGHT: 160 LBS | HEIGHT: 66 IN

## 2025-07-15 DIAGNOSIS — Z12.31 OTHER SCREENING MAMMOGRAM: ICD-10-CM

## 2025-07-15 PROCEDURE — 97140 MANUAL THERAPY 1/> REGIONS: CPT

## 2025-07-15 PROCEDURE — 77063 BREAST TOMOSYNTHESIS BI: CPT

## 2025-07-15 PROCEDURE — 97110 THERAPEUTIC EXERCISES: CPT

## 2025-07-15 NOTE — FLOWSHEET NOTE
[facility]  Rehabilitation and Sports Medicine    [facility]  Phone: [phone]  Fax: [phone]          Occupational Therapy Daily Treatment Note  Date:  7/15/2025    Patient Name:  Roger Stage    :  1980  MRN: [id number]  Restrictions/Precautions:      Medical/Treatment Diagnosis Information:   Diagnosis: Closed nondisplaced fracture of middle phalanx of left index finger with routine healing, subsequent encounter   Insurance/Certification information:OT Insurance Information: [employer]  Physician Information:  Juvencio Sena DO   Plan of care signed (Y/N):  n    Visit# / total visits:      Pain level: 0/10 at rest; 5/10 with PROM     Progress Note: []  Yes  [x]  No  Next due by: Visit #10      Date of evaluation/re-evaluation: 7/3/25-25    Time In: 205    Time Out: 250    Subjective:     Patient arrived with good affect for OT.  Patient reports feeling stiff throughout index finger; donning isotoner glove to treatment.    Objective/Assessment:   5 min MHP pre-tx  Issued A/AROM HEP; see flowsheet    Exercises:   Exercise/Equipment Resistance/Repetitions Other comments   PROM:  MCP, PIP, DIP, Composite Flex Hold 10s                    5x    Tendon Glides                                  10x    PIP Blocking Hold 5s                      5x    DIP Blocking Hold 5s                      5x                                                       Therapeutic Exercise  [x] Provided verbal/tactile cueing for activities related to strengthening, flexibility, endurance, ROM. (42392)  Neuro  Re-Ed  [] Provided verbal/tactile cueing for activities related to improving balance, coordination, kinesthetic sense, posture, motor skill, proprioception. (53520)     Therapeutic Activities/ADL:   [] Provided use of dynamic activities to improve functional performance (99433)  [] Provided self-care/home management training for activities of daily living and compensatory training (34404)   
decreased activity level

## 2025-07-17 ENCOUNTER — HOSPITAL ENCOUNTER (OUTPATIENT)
Dept: OCCUPATIONAL THERAPY | Age: 45
Setting detail: THERAPIES SERIES
Discharge: HOME | End: 2025-07-17
Attending: INTERNAL MEDICINE
Payer: MEDICAID

## 2025-07-17 PROCEDURE — 97140 MANUAL THERAPY 1/> REGIONS: CPT

## 2025-07-17 PROCEDURE — 97110 THERAPEUTIC EXERCISES: CPT

## 2025-07-17 NOTE — FLOWSHEET NOTE
[facility] [place] [facility]  Rehabilitation and Sports Medicine    [place]  Phone: [phone]  Fax: [phone]          Occupational Therapy Daily Treatment Note  Date:  2025    Patient Name:  Roger Stage    :  1980  MRN: [id number]  Restrictions/Precautions:      Medical/Treatment Diagnosis Information:   Diagnosis: Closed nondisplaced fracture of middle phalanx of left index finger with routine healing, subsequent encounter   Insurance/Certification information:OT Insurance Information: [employer]  Physician Information:  Juvencio Sena DO   Plan of care signed (Y/N):  n    Visit# / total visits:  3/12    Pain level: 0/10 at rest; /10 with PROM     Progress Note: []  Yes  [x]  No  Next due by: Visit #10      Date of evaluation/re-evaluation: 7/3/25-25    Time In: 142    Time Out: 232    Subjective:     Patient arrived with good affect for OT.    Objective/Assessment:   5 min MHP pre-tx  Issued finger compression sleeve for L index finger (size L); educated on wear and care during the day    Exercises:   Exercise/Equipment Resistance/Repetitions Other comments   PROM:  MCP, PIP, DIP, Composite Flex Hold 10s                    5x    Tendon Glides                                  10x    PIP Blocking Hold 5s                      5x    DIP Blocking Hold 5s                      5x    Washcloth Crumples                                   5x    FMC/Dexterity x Marbles   Flexbar Red                          10x N, U   Hand Gripper 5 red                        15x                                   Therapeutic Exercise  [x] Provided verbal/tactile cueing for activities related to strengthening, flexibility, endurance, ROM. (51621)  Neuro  Re-Ed  [] Provided verbal/tactile cueing for activities related to improving balance, coordination, kinesthetic sense, posture, motor skill, proprioception. (87188)     Therapeutic Activities/ADL:   [] Provided use of dynamic activities to improve functional

## 2025-07-21 ENCOUNTER — HOSPITAL ENCOUNTER (OUTPATIENT)
Dept: OCCUPATIONAL THERAPY | Age: 45
Setting detail: THERAPIES SERIES
Discharge: HOME | End: 2025-07-21
Attending: INTERNAL MEDICINE
Payer: MEDICAID

## 2025-07-21 PROCEDURE — 97140 MANUAL THERAPY 1/> REGIONS: CPT

## 2025-07-21 PROCEDURE — 97110 THERAPEUTIC EXERCISES: CPT

## 2025-07-21 NOTE — FLOWSHEET NOTE
[facility] [place] [facility]  Rehabilitation and Sports Medicine    [place]  Phone: [phone]  Fax: [phone]          Occupational Therapy Daily Treatment Note  Date:  2025    Patient Name:  Roger Stage    :  1980  MRN: [id number]  Restrictions/Precautions:      Medical/Treatment Diagnosis Information:   Diagnosis: Closed nondisplaced fracture of middle phalanx of left index finger with routine healing, subsequent encounter   Insurance/Certification information:OT Insurance Information: [employer]  Physician Information:  Juvencio Sena DO   Plan of care signed (Y/N):  n    Visit# / total visits:      Pain level: 0/10 at rest; 5/10 with PROM     Progress Note: []  Yes  [x]  No  Next due by: Visit #10      Date of evaluation/re-evaluation: 7/3/25-25    Time In: 135    Time Out: 233    Subjective:     Patient arrived with good affect for OT.  Patient reports losing finger sleeve but is still using isotoner glove for edema management.    Objective/Assessment:   5 min MHP pre-tx    AROM L Index Finger  MCP flexion: 88° (65° at eval)  PIP flexion: 67° (34° at eval)  DIP flexion: 22° (0° at eval)    Applied coban wrap to index finger to further decrease edema; issued roll of coban for home use.    Exercises:   Exercise/Equipment Resistance/Repetitions Other comments   PROM:  MCP, PIP, DIP, Composite Flex Hold 10s                    5x    Tendon Glides                                  10x HEP   PIP Blocking Hold 5s                      5x HEP   DIP Blocking Hold 5s                      5x HEP   Washcloth Crumples                                   5x HEP   FMC/Dexterity x Marbles   Flexbar Red                          15x N, U   Hand Gripper 5 red                        15x                   Putty Yellow-Green           10x , Roll/Pinch, Extend - HEP             Therapeutic Exercise  [x] Provided verbal/tactile cueing for activities related to strengthening, flexibility, endurance,

## 2025-07-24 ENCOUNTER — HOSPITAL ENCOUNTER (OUTPATIENT)
Age: 45
Discharge: HOME | End: 2025-07-24
Payer: MEDICAID

## 2025-07-24 ENCOUNTER — HOSPITAL ENCOUNTER (OUTPATIENT)
Dept: OCCUPATIONAL THERAPY | Age: 45
Setting detail: THERAPIES SERIES
Discharge: HOME | End: 2025-07-24
Attending: INTERNAL MEDICINE
Payer: MEDICAID

## 2025-07-24 DIAGNOSIS — Z13.220 SCREENING FOR LIPOID DISORDERS: ICD-10-CM

## 2025-07-24 DIAGNOSIS — Z13.228 SCREENING FOR METABOLIC DISORDER: ICD-10-CM

## 2025-07-24 DIAGNOSIS — Z13.0 SCREENING FOR DISORDER OF BLOOD AND BLOOD-FORMING ORGANS: ICD-10-CM

## 2025-07-24 LAB
ALBUMIN SERPL-MCNC: 4.6 G/DL (ref 3.5–5.2)
ALBUMIN/GLOB SERPL: 1.6 {RATIO} (ref 1–2.5)
ALP SERPL-CCNC: 68 U/L (ref 35–104)
ALT SERPL-CCNC: 33 U/L (ref 10–35)
ANION GAP SERPL CALCULATED.3IONS-SCNC: 10 MMOL/L (ref 9–16)
AST SERPL-CCNC: 24 U/L (ref 10–35)
BASOPHILS # BLD: <0.03 K/UL (ref 0–0.2)
BASOPHILS NFR BLD: 0 % (ref 0–2)
BILIRUB SERPL-MCNC: 0.5 MG/DL (ref 0–1.2)
BUN SERPL-MCNC: 7 MG/DL (ref 6–20)
BUN/CREAT SERPL: 12 (ref 9–20)
CALCIUM SERPL-MCNC: 9.6 MG/DL (ref 8.6–10.4)
CHLORIDE SERPL-SCNC: 103 MMOL/L (ref 98–107)
CHOLEST SERPL-MCNC: 219 MG/DL (ref 0–199)
CHOLESTEROL/HDL RATIO: 4.4
CO2 SERPL-SCNC: 26 MMOL/L (ref 20–31)
CREAT SERPL-MCNC: 0.6 MG/DL (ref 0.6–0.9)
EOSINOPHIL # BLD: 0.24 K/UL (ref 0–0.44)
EOSINOPHILS RELATIVE PERCENT: 4 % (ref 1–4)
ERYTHROCYTE [DISTWIDTH] IN BLOOD BY AUTOMATED COUNT: 12.4 % (ref 11.8–14.4)
GFR, ESTIMATED: >90 ML/MIN/1.73M2
GLUCOSE P FAST SERPL-MCNC: 85 MG/DL (ref 74–99)
HCT VFR BLD AUTO: 43.7 % (ref 36.3–47.1)
HDLC SERPL-MCNC: 50 MG/DL
HGB BLD-MCNC: 14.2 G/DL (ref 11.9–15.1)
IMM GRANULOCYTES # BLD AUTO: 0.03 K/UL (ref 0–0.3)
IMM GRANULOCYTES NFR BLD: 1 %
LDLC SERPL CALC-MCNC: 143 MG/DL (ref 0–100)
LYMPHOCYTES NFR BLD: 1.61 K/UL (ref 1.1–3.7)
LYMPHOCYTES RELATIVE PERCENT: 25 % (ref 24–43)
MCH RBC QN AUTO: 31.6 PG (ref 25.2–33.5)
MCHC RBC AUTO-ENTMCNC: 32.5 G/DL (ref 25.2–33.5)
MCV RBC AUTO: 97.3 FL (ref 82.6–102.9)
MONOCYTES NFR BLD: 0.56 K/UL (ref 0.1–1.2)
MONOCYTES NFR BLD: 9 % (ref 3–12)
NEUTROPHILS NFR BLD: 61 % (ref 36–65)
NEUTS SEG NFR BLD: 3.96 K/UL (ref 1.5–8.1)
NRBC BLD-RTO: 0 PER 100 WBC
PLATELET # BLD AUTO: 380 K/UL (ref 138–453)
PMV BLD AUTO: 8.7 FL (ref 8.1–13.5)
POTASSIUM SERPL-SCNC: 4.7 MMOL/L (ref 3.7–5.3)
PROT SERPL-MCNC: 7.5 G/DL (ref 6.6–8.7)
RBC # BLD AUTO: 4.49 M/UL (ref 3.95–5.11)
SODIUM SERPL-SCNC: 139 MMOL/L (ref 136–145)
TRIGL SERPL-MCNC: 129 MG/DL
VLDLC SERPL CALC-MCNC: 26 MG/DL (ref 1–30)
WBC OTHER # BLD: 6.4 K/UL (ref 3.5–11.3)

## 2025-07-24 PROCEDURE — 85025 COMPLETE CBC W/AUTO DIFF WBC: CPT

## 2025-07-24 PROCEDURE — 80053 COMPREHEN METABOLIC PANEL: CPT

## 2025-07-24 PROCEDURE — 36415 COLL VENOUS BLD VENIPUNCTURE: CPT

## 2025-07-24 PROCEDURE — 80061 LIPID PANEL: CPT

## 2025-07-24 PROCEDURE — 97140 MANUAL THERAPY 1/> REGIONS: CPT

## 2025-07-24 PROCEDURE — 97110 THERAPEUTIC EXERCISES: CPT

## 2025-07-24 NOTE — FLOWSHEET NOTE
[facility] [place] [facility]  Rehabilitation and Sports Medicine    [place]  Phone: [phone]  Fax: [phone]          Occupational Therapy Daily Treatment Note  Date:  2025    Patient Name:  Roger Stage    :  1980  MRN: [id number]  Restrictions/Precautions:      Medical/Treatment Diagnosis Information:   Diagnosis: Closed nondisplaced fracture of middle phalanx of left index finger with routine healing, subsequent encounter   Insurance/Certification information:OT Insurance Information: [employer]  Physician Information:  Juvencio Sena DO   Plan of care signed (Y/N):  n    Visit# / total visits:      Pain level: 0/10 at rest; 5/10 with PROM     Progress Note: []  Yes  [x]  No  Next due by: Visit #10      Date of evaluation/re-evaluation: 7/3/25-25    Time In: 903    Time Out: 953    Subjective:     Patient arrived with good affect for OT.    Objective/Assessment:   5 min MHP pre-tx    L : 25#    Exercises:   Exercise/Equipment Resistance/Repetitions Other comments   PROM:  MCP, PIP, DIP, Composite Flex Hold 10s                    5x    Tendon Glides                                  10x HEP   PIP Blocking Hold 5s                      5x HEP   DIP Blocking Hold 5s                      5x HEP   Washcloth Crumples                                   5x HEP   FMC/Dexterity x Marbles  Cards  Screws/Nuts   Flexbar Red                          15x N, U, Twist   Hand Gripper 5 red                        15x    Pinch Pin Red                          15x Tripod Pinch             Putty Yellow-Green           10x , Roll/Pinch, Extend - HEP             Therapeutic Exercise  [x] Provided verbal/tactile cueing for activities related to strengthening, flexibility, endurance, ROM. (99020)  Neuro  Re-Ed  [] Provided verbal/tactile cueing for activities related to improving balance, coordination, kinesthetic sense, posture, motor skill, proprioception. (27871)     Therapeutic Activities/ADL:

## 2025-07-30 ENCOUNTER — HOSPITAL ENCOUNTER (OUTPATIENT)
Dept: OCCUPATIONAL THERAPY | Age: 45
Setting detail: THERAPIES SERIES
Discharge: HOME | End: 2025-07-30
Attending: INTERNAL MEDICINE
Payer: MEDICAID

## 2025-07-30 PROCEDURE — 97140 MANUAL THERAPY 1/> REGIONS: CPT

## 2025-07-30 PROCEDURE — 97110 THERAPEUTIC EXERCISES: CPT

## 2025-07-30 NOTE — FLOWSHEET NOTE
improved I/ADLs  Long Term Goal 2: Patient to demonstrate increased AROM LUE:  > 40# for improved I/ADLs  Long Term Goal 3: Patient to demonstrate improved FMC with L 9HPT < 17s  Long Term Goal 4: Patient to verbalize decreased pain L hand/thumb < 1-2/10 with use/movement  Long Term Goal 5: Patient to demonstrate improved functional use with upper extremity functional index > 65/80    Plan:   [x] Continue per plan of care [] Alter current plan (see comments)  [] Plan of care initiated [] Hold pending MD visit [] Discharge    Plan for Next Session:      Electronically signed by:  JONES Miner

## 2025-08-01 ENCOUNTER — HOSPITAL ENCOUNTER (OUTPATIENT)
Dept: OCCUPATIONAL THERAPY | Age: 45
Setting detail: THERAPIES SERIES
Discharge: HOME | End: 2025-08-01
Attending: INTERNAL MEDICINE
Payer: MEDICAID

## 2025-08-01 ENCOUNTER — OFFICE VISIT (OUTPATIENT)
Dept: INTERNAL MEDICINE | Age: 45
End: 2025-08-01
Payer: MEDICAID

## 2025-08-01 VITALS
WEIGHT: 161 LBS | BODY MASS INDEX: 25.88 KG/M2 | SYSTOLIC BLOOD PRESSURE: 110 MMHG | HEIGHT: 66 IN | RESPIRATION RATE: 16 BRPM | DIASTOLIC BLOOD PRESSURE: 78 MMHG | HEART RATE: 64 BPM

## 2025-08-01 DIAGNOSIS — F90.9 ATTENTION DEFICIT HYPERACTIVITY DISORDER (ADHD), UNSPECIFIED ADHD TYPE: Primary | ICD-10-CM

## 2025-08-01 DIAGNOSIS — E78.2 MIXED HYPERLIPIDEMIA: ICD-10-CM

## 2025-08-01 DIAGNOSIS — S62.651D CLOSED NONDISPLACED FRACTURE OF MIDDLE PHALANX OF LEFT INDEX FINGER WITH ROUTINE HEALING, SUBSEQUENT ENCOUNTER: ICD-10-CM

## 2025-08-01 DIAGNOSIS — M54.2 NECK PAIN: ICD-10-CM

## 2025-08-01 PROCEDURE — 97140 MANUAL THERAPY 1/> REGIONS: CPT

## 2025-08-01 PROCEDURE — 97110 THERAPEUTIC EXERCISES: CPT

## 2025-08-01 PROCEDURE — 99214 OFFICE O/P EST MOD 30 MIN: CPT | Performed by: INTERNAL MEDICINE

## 2025-08-01 PROCEDURE — 99212 OFFICE O/P EST SF 10 MIN: CPT | Performed by: INTERNAL MEDICINE

## 2025-08-01 RX ORDER — DEXTROAMPHETAMINE SACCHARATE, AMPHETAMINE ASPARTATE MONOHYDRATE, DEXTROAMPHETAMINE SULFATE AND AMPHETAMINE SULFATE 3.75; 3.75; 3.75; 3.75 MG/1; MG/1; MG/1; MG/1
15 CAPSULE, EXTENDED RELEASE ORAL DAILY
Qty: 30 CAPSULE | Refills: 0 | Status: SHIPPED | OUTPATIENT
Start: 2025-08-01 | End: 2025-08-31

## 2025-08-01 NOTE — FLOWSHEET NOTE
[facility] [place] [facility]  Rehabilitation and Sports Medicine    [place]  Phone: [phone]  Fax: [phone]          Occupational Therapy Daily Treatment Note  Date:  2025    Patient Name:  Wilnerathy Stage    :  1980  MRN: [id number]  Restrictions/Precautions:      Medical/Treatment Diagnosis Information:   Diagnosis: Closed nondisplaced fracture of middle phalanx of left index finger with routine healing, subsequent encounter   Insurance/Certification information:OT Insurance Information: [employer] [place] [employer]  Physician Information:  Juvencio Sena DO   Plan of care signed (Y/N):  n    Visit# / total visits:      Pain level: 0/10 at rest; 1-2/10 with use and movement; 4-5/10 with PROM     Progress Note: []  Yes  [x]  No  Next due by: Visit #10      Date of evaluation/re-evaluation: 7/3/25-25    Time In: 1106    Time Out: 1155    Subjective:     Patient arrived with good affect for OT.    Objective/Assessment:   5 min paraffin with MHP pre-tx    Biofreeze and coban wrap applied to L index finger at end of session    Exercises:   Exercise/Equipment Resistance/Repetitions Other comments   PROM:  MCP, PIP, DIP, Composite Flex Hold 10s                    5x    Tendon Glides                                  10x HEP   PIP Blocking Hold 5s                      5x HEP   DIP Blocking Hold 5s                      5x HEP   Washcloth Crumples                                   5x HEP   FMC/Dexterity x Marbles  Cards  Flexbar Red                          20x N, U, Twist   Hand Gripper 5 red                        20x L hand   Pinch Pin Red                          20x Tripod Pinch   Hand X-Trainer Red                          10x Composite Extension   Wrist Roller 1#                               3x    Putty Yellow-Green           10x , Roll/Pinch, Extend - HEP             Therapeutic Exercise  [x] Provided verbal/tactile cueing for activities related to strengthening, flexibility, endurance, ROM.

## 2025-08-04 ENCOUNTER — HOSPITAL ENCOUNTER (OUTPATIENT)
Dept: OCCUPATIONAL THERAPY | Age: 45
Setting detail: THERAPIES SERIES
Discharge: HOME | End: 2025-08-04
Attending: INTERNAL MEDICINE
Payer: MEDICAID

## 2025-08-04 PROCEDURE — 97110 THERAPEUTIC EXERCISES: CPT

## 2025-08-04 PROCEDURE — 97140 MANUAL THERAPY 1/> REGIONS: CPT

## 2025-08-07 ENCOUNTER — APPOINTMENT (OUTPATIENT)
Dept: OCCUPATIONAL THERAPY | Age: 45
End: 2025-08-07
Attending: INTERNAL MEDICINE
Payer: MEDICAID

## 2025-08-07 ENCOUNTER — APPOINTMENT (OUTPATIENT)
Dept: PHYSICAL THERAPY | Age: 45
End: 2025-08-07
Attending: INTERNAL MEDICINE
Payer: MEDICAID

## 2025-08-08 ENCOUNTER — HOSPITAL ENCOUNTER (OUTPATIENT)
Dept: OCCUPATIONAL THERAPY | Age: 45
Setting detail: THERAPIES SERIES
Discharge: HOME | End: 2025-08-08
Attending: INTERNAL MEDICINE
Payer: MEDICAID

## 2025-08-08 PROCEDURE — 97110 THERAPEUTIC EXERCISES: CPT

## 2025-08-08 PROCEDURE — 97140 MANUAL THERAPY 1/> REGIONS: CPT

## 2025-08-13 ENCOUNTER — HOSPITAL ENCOUNTER (OUTPATIENT)
Dept: OCCUPATIONAL THERAPY | Age: 45
Setting detail: THERAPIES SERIES
Discharge: HOME | End: 2025-08-13
Attending: INTERNAL MEDICINE
Payer: MEDICAID

## 2025-08-13 PROCEDURE — 97110 THERAPEUTIC EXERCISES: CPT

## 2025-08-13 PROCEDURE — 97140 MANUAL THERAPY 1/> REGIONS: CPT

## 2025-08-15 ENCOUNTER — HOSPITAL ENCOUNTER (OUTPATIENT)
Dept: OCCUPATIONAL THERAPY | Age: 45
Setting detail: THERAPIES SERIES
Discharge: HOME | End: 2025-08-15
Attending: INTERNAL MEDICINE
Payer: MEDICAID

## 2025-08-15 PROCEDURE — 97110 THERAPEUTIC EXERCISES: CPT

## 2025-08-15 PROCEDURE — 97140 MANUAL THERAPY 1/> REGIONS: CPT

## 2025-08-19 ENCOUNTER — OFFICE VISIT (OUTPATIENT)
Dept: BEHAVIORAL/MENTAL HEALTH | Age: 45
End: 2025-08-19
Payer: MEDICAID

## 2025-08-19 ENCOUNTER — HOSPITAL ENCOUNTER (OUTPATIENT)
Dept: OCCUPATIONAL THERAPY | Age: 45
Setting detail: THERAPIES SERIES
Discharge: HOME | End: 2025-08-19
Attending: INTERNAL MEDICINE
Payer: MEDICAID

## 2025-08-19 DIAGNOSIS — F90.9 ATTENTION DEFICIT HYPERACTIVITY DISORDER (ADHD), UNSPECIFIED ADHD TYPE: Primary | ICD-10-CM

## 2025-08-19 PROCEDURE — 90837 PSYTX W PT 60 MINUTES: CPT | Performed by: COUNSELOR

## 2025-08-19 PROCEDURE — 97140 MANUAL THERAPY 1/> REGIONS: CPT

## 2025-08-19 PROCEDURE — 97110 THERAPEUTIC EXERCISES: CPT

## 2025-08-20 DIAGNOSIS — S62.651D CLOSED NONDISPLACED FRACTURE OF MIDDLE PHALANX OF LEFT INDEX FINGER WITH ROUTINE HEALING, SUBSEQUENT ENCOUNTER: Primary | ICD-10-CM

## 2025-08-22 ENCOUNTER — APPOINTMENT (OUTPATIENT)
Dept: OCCUPATIONAL THERAPY | Age: 45
End: 2025-08-22
Attending: INTERNAL MEDICINE
Payer: MEDICAID

## 2025-09-02 ENCOUNTER — HOSPITAL ENCOUNTER (OUTPATIENT)
Dept: GENERAL RADIOLOGY | Age: 45
Discharge: HOME | End: 2025-09-04
Attending: ORTHOPAEDIC SURGERY
Payer: MEDICAID

## 2025-09-02 ENCOUNTER — OFFICE VISIT (OUTPATIENT)
Dept: ORTHOPEDIC SURGERY | Age: 45
End: 2025-09-02
Attending: ORTHOPAEDIC SURGERY
Payer: MEDICAID

## 2025-09-02 VITALS
HEART RATE: 72 BPM | RESPIRATION RATE: 12 BRPM | SYSTOLIC BLOOD PRESSURE: 118 MMHG | BODY MASS INDEX: 25.88 KG/M2 | HEIGHT: 66 IN | WEIGHT: 161 LBS | DIASTOLIC BLOOD PRESSURE: 82 MMHG

## 2025-09-02 DIAGNOSIS — S62.651D CLOSED NONDISPLACED FRACTURE OF MIDDLE PHALANX OF LEFT INDEX FINGER WITH ROUTINE HEALING, SUBSEQUENT ENCOUNTER: ICD-10-CM

## 2025-09-02 DIAGNOSIS — M54.2 CERVICAL SPINE PAIN: ICD-10-CM

## 2025-09-02 DIAGNOSIS — S62.651A NONDISPLACED FRACTURE OF MIDDLE PHALANX OF LEFT INDEX FINGER, INITIAL ENCOUNTER FOR CLOSED FRACTURE: Primary | ICD-10-CM

## 2025-09-02 PROCEDURE — 99211 OFF/OP EST MAY X REQ PHY/QHP: CPT | Performed by: NURSE PRACTITIONER

## 2025-09-02 PROCEDURE — 73130 X-RAY EXAM OF HAND: CPT

## 2025-09-02 PROCEDURE — 99024 POSTOP FOLLOW-UP VISIT: CPT | Performed by: NURSE PRACTITIONER
